# Patient Record
Sex: MALE | Race: ASIAN
[De-identification: names, ages, dates, MRNs, and addresses within clinical notes are randomized per-mention and may not be internally consistent; named-entity substitution may affect disease eponyms.]

---

## 2017-09-25 ENCOUNTER — HOSPITAL ENCOUNTER (OUTPATIENT)
Dept: HOSPITAL 74 - JER | Age: 22
Setting detail: OBSERVATION
LOS: 3 days | Discharge: HOME | End: 2017-09-28
Attending: INTERNAL MEDICINE | Admitting: INTERNAL MEDICINE
Payer: COMMERCIAL

## 2017-09-25 VITALS — BODY MASS INDEX: 25.1 KG/M2

## 2017-09-25 DIAGNOSIS — R55: ICD-10-CM

## 2017-09-25 DIAGNOSIS — R00.0: Primary | ICD-10-CM

## 2017-09-25 DIAGNOSIS — H93.19: ICD-10-CM

## 2017-09-25 DIAGNOSIS — I10: ICD-10-CM

## 2017-09-25 DIAGNOSIS — R50.9: ICD-10-CM

## 2017-09-25 DIAGNOSIS — H83.09: ICD-10-CM

## 2017-09-25 DIAGNOSIS — F17.210: ICD-10-CM

## 2017-09-25 PROCEDURE — G0378 HOSPITAL OBSERVATION PER HR: HCPCS

## 2017-09-26 LAB
ALBUMIN SERPL-MCNC: 4.3 G/DL (ref 3.4–5)
ALP SERPL-CCNC: 92 U/L (ref 45–117)
ALT SERPL-CCNC: 29 U/L (ref 12–78)
ANION GAP SERPL CALC-SCNC: 10 MMOL/L (ref 8–16)
ANION GAP SERPL CALC-SCNC: 7 MMOL/L (ref 8–16)
APPEARANCE UR: CLEAR
AST SERPL-CCNC: 15 U/L (ref 15–37)
BILIRUB SERPL-MCNC: 0.2 MG/DL (ref 0.2–1)
BILIRUB UR STRIP.AUTO-MCNC: NEGATIVE MG/DL
CALCIUM SERPL-MCNC: 8.3 MG/DL (ref 8.5–10.1)
CALCIUM SERPL-MCNC: 9 MG/DL (ref 8.5–10.1)
CK SERPL-CCNC: 258 IU/L (ref 39–308)
CO2 SERPL-SCNC: 23 MMOL/L (ref 21–32)
CO2 SERPL-SCNC: 25 MMOL/L (ref 21–32)
COLOR UR: (no result)
CREAT SERPL-MCNC: 0.7 MG/DL (ref 0.7–1.3)
CREAT SERPL-MCNC: 0.9 MG/DL (ref 0.7–1.3)
DEPRECATED RDW RBC AUTO: 13.4 % (ref 11.9–15.9)
GLUCOSE SERPL-MCNC: 122 MG/DL (ref 74–106)
GLUCOSE SERPL-MCNC: 98 MG/DL (ref 74–106)
INR BLD: 0.91 (ref 0.82–1.09)
KETONES UR QL STRIP: NEGATIVE
LEUKOCYTE ESTERASE UR QL STRIP.AUTO: NEGATIVE
MCH RBC QN AUTO: 29.3 PG (ref 25.7–33.7)
MCHC RBC AUTO-ENTMCNC: 34.2 G/DL (ref 32–35.9)
MCV RBC: 85.4 FL (ref 80–96)
NITRITE UR QL STRIP: NEGATIVE
PH BLDV: 7.39 [PH] (ref 7.32–7.42)
PH UR: 5 [PH] (ref 5–8)
PLATELET # BLD AUTO: 286 K/MM3 (ref 134–434)
PLATELET # BLD EST: ADEQUATE 10*3/UL
PMV BLD: 9.1 FL (ref 7.5–11.1)
PROT SERPL-MCNC: 7.4 G/DL (ref 6.4–8.2)
PROT UR QL STRIP: NEGATIVE
PROT UR QL STRIP: NEGATIVE
PT PNL PPP: 10 SEC (ref 9.98–11.88)
RBC # UR STRIP: NEGATIVE /UL
SALICYLATES SERPL-MCNC: < 4 MG/DL (ref 0–30)
SAO2 % BLDV: 23.1 MEQ/L (ref 19–25)
SP GR UR: 1.02 (ref 1–1.02)
TOTAL CELLS COUNTED BLD: 100
TROPONIN I SERPL-MCNC: < 0.02 NG/ML (ref 0–0.05)
URINE MARIJUANA THC: POSITIVE NG/ML
UROBILINOGEN UR STRIP-MCNC: NEGATIVE MG/DL (ref 0.2–1)
VARIANT LYMPHS NFR BLD MANUAL: 5 % (ref 0–80)
WBC # BLD AUTO: 10.6 K/MM3 (ref 4–10)

## 2017-09-26 RX ADMIN — CEFTRIAXONE SCH MLS/HR: 1 INJECTION, POWDER, FOR SOLUTION INTRAMUSCULAR; INTRAVENOUS at 20:19

## 2017-09-26 NOTE — CONS
DATE OF CONSULTATION:  09/26/2017

 

REQUESTED BY:  Cristal Sahu MD

 

REASON FOR CONSULTATION:  Sinus tachycardia.

 

HISTORY OF PRESENT ILLNESS:  The patient is a 22-year-old male with no chronic

medical problems who presented to the emergency room last evening with complaints of

generalized weakness, lightheadedness and nausea that began around 11 p.m.  He states

he smoked marijuana for the first time and a short while afterwards began feeling

these stated symptoms.  He was lying down at home and then began to feel lightheaded,

nauseous, pale, and shaky.  According to the ER records, he called his friend who

lives downstairs to help him to the emergency room.  The friend reported he was

shaking and pale.  They called a taxi and then obtained a ride from a passerby to the

emergency room.  On arrival in the ER, he was lightheaded and presyncopal.  He

appeared pale, diaphoretic, and lethargic.  Of note, he was tachycardic with a

low-grade temperature, 99.3 rectally, and an initial blood pressure of 140/110.  His

blood glucose was normal.  He denies sick contacts, recent travel.  He is from [facility],

immigrated here years ago, currently works in a grocery store.  He denied headaches,

changes in vision, weakness, numbness.  He denies vomiting, diarrhea.  No chest pain

or shortness of breath.  He has no allergies.

Takes no chronic medications.

 

FAMILY HISTORY:  Negative for early CAD or sudden cardiac death.

 

SOCIAL HISTORY:  Smokes 1 pack per day of cigarettes.  He denies recreational drug

use apart from his use of marijuana last night.  He does not drink.

 

PHYSICAL EXAMINATION:

General:  He was sleeping but easily aroused.

Vital Signs:  Temperature 98.8, pulse 95, regular, blood pressure 129/95, O2

saturation 99 on room air, breathing at 12 respirations per minute.  Telemetry: 

Sinus rhythm, sinus tachycardia.

Neck:  No bruits.

Heart:  S1, 2 regular.  No murmurs.

Chest:  Clear.  No wheezing, rales or rhonchi.

Abdomen:  Soft, nontender.

Extremities:  Warm.  No clubbing, cyanosis or edema.

 

LABORATORIES:  White count initially 10.6, hematocrit 47, platelets 286.  There is a

lymphocyte shift with 60% lymphocytes.  D-dimer was negative.  INR 0.9.  VBG 7.39,

39, 68.  Sodium 139, potassium was 3.8, creatinine 0.9, lactic acid 2.7.  LFTs were

normal.  CK and troponin were negative.

 

Urinalysis was negative.  Toxicology was negative for salicylates, acetaminophen, and

only positive for marijuana.

 

Cultures are pending, including urine and blood; influenza swab was negative.

 

Chest x-ray was negative.  Head CT was negative.

 

ECG showed sinus tachycardia at 122 beats per minute.

 

IMPRESSION:  Sinus tachycardia, hypertension, malaise and low-grade fever of unclear

etiology.  The differential diagnosis is broad at this point and includes infection,

drug effect, or metabolic abnormality.

 

RECOMMENDATIONS:  

1.  Follow cultures.

2.  Echocardiogram for assessment of EF, rule out pericardial effusion.

3.  Telemetry.

4.  Further workup as per PMD.  Will follow.

 

Thank you for the consultation.

 

 

GERARDO SAGASTUME M.D.

 

[id number]

DD: 09/26/2017 08:36

DT: 09/26/2017 14:39

Job #:  [id number]

## 2017-09-26 NOTE — PN
Progress Note (short form)





- Note


Progress Note: 


Consult Dictated





Sinus tachycardia, HTN, malaise, low grade fever


Broad differential: infection/drug effect/metabolic


Unclear etiology at this point, but seems to be improving





REC:


Follow cultures


Echo


Telemetry


Further work up as per PMD

## 2017-09-26 NOTE — EKG
Test Reason : 

Blood Pressure : ***/*** mmHG

Vent. Rate : 122 BPM     Atrial Rate : 122 BPM

   P-R Int : 156 ms          QRS Dur : 092 ms

    QT Int : 320 ms       P-R-T Axes : 044 080 037 degrees

   QTc Int : 456 ms

 

SINUS TACHYCARDIA

OTHERWISE NORMAL ECG

WHEN COMPARED WITH ECG OF 25-SEP-2017 23:52,

PREVIOUS ECG HAS UNDETERMINED RHYTHM, NEEDS REVIEW

BASELINE ARTIFACTS

REPEAT EKG IF CLINICALLY INDICATED

Confirmed by NAOMI MENDEZ MD ([id number]) on 9/26/2017 10:16:49 AM

 

Referred By:             Confirmed By:NAOMI MENDEZ MD

## 2017-09-26 NOTE — CON.ID
Consult


Consult Specialty:: infectious diseases


Reason for Consultation:: fever weakness,lactic acidosis





- History of Present Illness


Chief Complaint: weakness


History of Present Illness: 


22 year old male, with no significant past medical, admitted with weakness, 

lightheadedness, and nausea 


. The patient says that he was laying on the bed at home when he suddenly felt 

lightheaded, nauseas, pale, and shaky. He called his friend, who lives 

downstairs, to help him get to the emergency room. His friend notes he was 

"yellow" in color and shaking.


In the ED the patient felt very lightheaded like he was going to pass out. He 

was not ambulatory secondary to dizziness upon arrival and was brought in 

emergently to room 10 due to his lighthheadedness. He was pale in appearance, 

diaphoretic and lethargic but arousble. Vitals were notable to for tachycardic 

to 120s, rectal temp 99.3, and /110. 


one thing which was odd is that at this young age patient had such a high blood 

pressure





- History Source


History Provided By: Patient, Medical Record


Limitations to Obtaining History: Language Barrier





- Alcohol/Substance Use


Hx Alcohol Use: No





- Smoking History


Smoking history: Current every day smoker


Have you smoked in the past 12 months: Yes


Aproximately how many cigarettes per day: 5





Home Medications





- Allergies


Allergies/Adverse Reactions: 


 Allergies











Allergy/AdvReac Type Severity Reaction Status Date / Time


 


No Known Allergies Allergy   Verified 09/26/17 00:04














- Home Medications


Home Medications: 


Ambulatory Orders





NK [No Known Home Medication]  09/26/17 











Review of Systems





- Review of Systems


Constitutional: reports: Weakness


Eyes: reports: No Symptoms


HENT: reports: No Symptoms


Neck: reports: No Symptoms


Cardiovascular: reports: No Symptoms


Respiratory: reports: No Symptoms


Gastrointestinal: reports: No Symptoms


Genitourinary: reports: No Symptoms


Musculoskeletal: reports: No Symptoms


Integumentary: reports: No Symptoms


Neurological: reports: Dizziness, Weakness, Other


Endocrine: reports: No Symptoms


Hematology/Lymphatic: reports: No Symptoms


Psychiatric: reports: No Symptoms





Physical Exam


Vital Signs: 


 Vital Signs











Temperature  98.2 F   09/26/17 14:14


 


Pulse Rate  76   09/26/17 14:14


 


Respiratory Rate  18   09/26/17 14:14


 


Blood Pressure  118/67   09/26/17 14:14


 


O2 Sat by Pulse Oximetry (%)  100   09/26/17 09:00











Constitutional: Yes: No Distress, Calm, Thin


Eyes: Yes: Conjunctiva Clear


Neck: Yes: Supple


Cardiovascular: Yes: Regular Rate and Rhythm


Respiratory: Yes: Regular, CTA Bilaterally


Gastrointestinal: Yes: Normal Bowel Sounds, Soft


Musculoskeletal: Yes: WNL


Extremities: Yes: WNL


Integumentary: Yes: WNL


Neurological: Yes: WNL


Psychiatric: Yes: WNL


Labs: 


 CBC, BMP





 09/26/17 06:43 











Imaging





- Results


Chest X-ray: Report Reviewed, Image Reviewed


Cat Scan: Report Reviewed, Image Reviewed





Assessment/Plan


Problem List





- Problems


(1) Tachycardia


Code(s): R00.0 - TACHYCARDIA, UNSPECIFIED





(2) Near syncope


Code(s): R55 - SYNCOPE AND COLLAPSE





plan


will start cefriaxone


await for all cx to be back

## 2017-09-26 NOTE — PDOC
History of Present Illness





- General


History Source: Patient


Exam Limitations: No Limitations





- History of Present Illness


Initial Comments: 





09/26/17 03:22


The patient is a 22 year old male, with no significant past medical, who 

presents to the emergency room complaining of weakness, lightheadedness, and 

nausea that started at 11:00pm this evening, just an hour prior to arrival. The 

patient explains that he was laying on the bed at home when he suddenly felt 

lightheaded, nauseas, pale, and shaky. He called his friend, who lives 

downstairs, to help him get to the emergency room. His friend notes he was 

"yellow" in color and shaking. They called a taxi that never showed up and 

instead hitched a ride from a passerby to the emergency room. Upon arrival, the 

patient felt very lightheaded like he was going to pass out. He was not 

ambulatory secondary to dizziness upon arrival and was brought in emergently to 

room 10 due to his lighthheadedness. He was pale in appearance, diaphoretic and 

lethargic but arousble. Vitals were notable to for tachycardic to 120s, rectal 

temp 99.3, and /110. He notes that he visited his PCP 2 months ago and 

everything was normal. His blood glucose upon arrival was 100. No recent 

travel. No hx of similar sxs. 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 

restaurant. 





Denies headache, changes in vision, focal weakness or numbness.


Denies vomiting, diarrhea. 


Denies chest pain, SOB. 


Denies abdominal pain. 


Denies recent travel. 





Allergies: NKDA


Social Hx: Tobacco use. Denies recreational drug use. No alcohol use. 


PCP: Dr. Anthony Mena





<Bebe Cagle - Last Filed: 09/26/17 03:22>





<Allyn Elizabeth - Last Filed: 09/26/17 03:49>





- General


Chief Complaint: Altered Mental Status


Stated Complaint: WEAKNESS


Time Seen by Provider: 09/26/17 00:09





Past History





<Bebe Cagle - Last Filed: 09/26/17 03:22>





- Suicide/Smoking/Psychosocial Hx


Smoking History: Current every day smoker


Have you smoked in the past 12 months: Yes


Number of Cigarettes Smoked Daily: 5


Information on smoking cessation initiated: No


Hx Alcohol Use: No


Drug/Substance Use Hx: No





<Allyn Elizabeth - Last Filed: 09/26/17 03:49>





- Past Medical History


Allergies/Adverse Reactions: 


 Allergies











Allergy/AdvReac Type Severity Reaction Status Date / Time


 


No Known Allergies Allergy   Verified 09/26/17 00:04











Home Medications: 


Ambulatory Orders





NK [No Known Home Medication]  09/26/17 











**Review of Systems





- Review of Systems


Able to Perform ROS?: Yes


Comments:: 





09/26/17 03:22


GENERAL/CONSTITUTIONAL: +weakness, +dizziness


HEAD, EYES, EARS, NOSE AND THROAT: No change in vision. No ear pain or 

discharge. No sore throat. GASTROINTESTINAL: +nausea. No vomiting, diarrhea or 

constipation. 


GENITOURINARY: No dysuria, frequency, or change in urination. 


CARDIOVASCULAR: No chest pain or shortness of breath. 


RESPIRATORY: No cough, wheezing, or hemoptysis. 


MUSCULOSKELETAL: No joint or muscle swelling or pain. No neck or back pain.


SKIN: No rash 


NEUROLOGIC: No headache, vertigo, loss of consciousness, or change in strength/

sensation. 


ENDOCRINE: No increased thirst. No abnormal weight change. 


HEMATOLOGIC/LYMPHATIC: No anemia, easy bleeding, or history of blood clots. 

ALLERGIC/IMMUNOLOGIC: No hives or skin allergy.





<Bebe Cagle - Last Filed: 09/26/17 03:22>





*Physical Exam





- Vital Signs


 Last Vital Signs











Temp Pulse Resp BP Pulse Ox


 


 99.4 F   121 H  18   145/68   99 


 


 09/25/17 23:58  09/25/17 23:58  09/25/17 23:58  09/25/17 23:58  09/25/17 23:58














- Physical Exam


Comments: 





09/26/17 03:22


GENERAL: Pale in appearance, diaphoretic, lethargic but arousable. A&O x3. 


HEAD: No signs of trauma 


EYES: PERRLA, EOMI, sclera anicteric, conjunctiva clear 


ENT: Auricles normal inspection, hearing grossly normal, nares patent, 

oropharynx clear without exudates. Moist mucosa 


NECK: Normal ROM, supple, no lymphadenopathy, JVD, or masses


LUNGS: Breath sounds equal, clear to auscultation bilaterally. No wheezes, and 

no crackles 


HEART: Tachycardic to 122, but normal rhythm, normal S1 and S2, no murmurs, 

rubs or gallops 


ABDOMEN: Soft, nontender, normoactive bowel sounds. No guarding, no rebound. No 

masses 


EXTREMITIES: Normal range of motion, no edema. No clubbing or cyanosis. No cords

, erythema, or tenderness 


BACK: No midline spinal tenderness in cervical/thoracic/lumbar region 


NEUROLOGICAL: Normal speech, cranial nerves intact, negative pronator drift, 5/

5 strength in all 4 extremities, normal sensation to light touch in all 4 

extremities, normal cerebellar exam, normal gait, normal reflexes and tone 


SKIN: Warm, Dry, pale, no rashes or lesions noted.





<Bebe Cagle - Last Filed: 09/26/17 03:22>





- Vital Signs


 Last Vital Signs











Temp Pulse Resp BP Pulse Ox


 


 99.4 F   121 H  18   145/68   99 


 


 09/25/17 23:58  09/25/17 23:58  09/25/17 23:58  09/25/17 23:58  09/25/17 23:58














<Allyn Elizabeth - Last Filed: 09/26/17 03:49>





**Heart Score/ECG Review


  ** #1





09/26/17 03:46


Sinus tachycardia, rate 122, normal axis and intervals. N MARTA. Large voltage 

QRS. No dagger Q waves. 





<Allyn Elizabeth - Last Filed: 09/26/17 03:49>





ED Treatment Course





- LABORATORY


CBC & Chemistry Diagram: 


 09/26/17 00:08





 09/26/17 00:08





- ADDITIONAL ORDERS


Additional order review: 


 Laboratory  Results











  09/26/17 09/26/17 09/26/17





  00:40 00:40 00:27


 


PT with INR   


 


INR   


 


D-Dimer   


 


Sodium   


 


Potassium   


 


Chloride   


 


Carbon Dioxide   


 


Anion Gap   


 


BUN   


 


Creatinine   


 


Creat Clearance w eGFR   


 


POC Glucometer   


 


Random Glucose   


 


Lactic Acid    2.7 H*


 


Calcium   


 


Total Bilirubin   


 


AST   


 


ALT   


 


Alkaline Phosphatase   


 


Creatine Kinase   


 


Creatine Kinase Index   


 


CK-MB (CK-2)   


 


Troponin I   


 


Total Protein   


 


Albumin   


 


Urine Color   Ltyellow 


 


Urine Appearance   Clear 


 


Urine pH   5.0 


 


Urine Protein   Negative 


 


Urine Glucose (UA)   Negative 


 


Urine Ketones   Negative 


 


Urine Blood   Negative 


 


Urine Nitrite   Negative 


 


Urine Bilirubin   Negative 


 


Urine Urobilinogen   Negative 


 


Salicylates   


 


Opiates Screen  Negative  


 


Methadone Screen  Negative  


 


Acetaminophen   


 


Barbiturate Screen  Negative  


 


Phencyclidine Screen  Negative  


 


Ur Amphetamines Screen  Negative  


 


MDMA (Ecstasy) Screen  Negative  


 


Benzodiazepines Screen  Negative  


 


Cocaine Screen  Negative  


 


U Marijuana (THC) Screen  Positive  


 


Alcohol, Quantitative   


 


Blood Type   


 


Antibody Screen   














  09/26/17 09/26/17 09/26/17





  00:08 00:08 00:08


 


PT with INR   


 


INR   


 


D-Dimer    < 200


 


Sodium   


 


Potassium   


 


Chloride   


 


Carbon Dioxide   


 


Anion Gap   


 


BUN   


 


Creatinine   


 


Creat Clearance w eGFR   


 


POC Glucometer   


 


Random Glucose   


 


Lactic Acid   


 


Calcium   


 


Total Bilirubin   


 


AST   


 


ALT   


 


Alkaline Phosphatase   


 


Creatine Kinase   


 


Creatine Kinase Index   


 


CK-MB (CK-2)   


 


Troponin I   


 


Total Protein   


 


Albumin   


 


Urine Color   


 


Urine Appearance   


 


Urine pH   


 


Urine Protein   


 


Urine Glucose (UA)   


 


Urine Ketones   


 


Urine Blood   


 


Urine Nitrite   


 


Urine Bilirubin   


 


Urine Urobilinogen   


 


Salicylates   < 4.0 


 


Opiates Screen   


 


Methadone Screen   


 


Acetaminophen   < 11 


 


Barbiturate Screen   


 


Phencyclidine Screen   


 


Ur Amphetamines Screen   


 


MDMA (Ecstasy) Screen   


 


Benzodiazepines Screen   


 


Cocaine Screen   


 


U Marijuana (THC) Screen   


 


Alcohol, Quantitative  < 5.0  


 


Blood Type   


 


Antibody Screen   














  09/26/17 09/26/17 09/26/17





  00:08 00:08 00:08


 


PT with INR    10.00


 


INR    0.91


 


D-Dimer   


 


Sodium   139 


 


Potassium   3.2 L 


 


Chloride   104 


 


Carbon Dioxide   25 


 


Anion Gap   10 


 


BUN   11 


 


Creatinine   0.9 


 


Creat Clearance w eGFR   > 60 


 


POC Glucometer   


 


Random Glucose   122 H 


 


Lactic Acid   


 


Calcium   9.0 


 


Total Bilirubin   0.2 


 


AST   15 


 


ALT   29 


 


Alkaline Phosphatase   92 


 


Creatine Kinase   258 


 


Creatine Kinase Index   0.3 


 


CK-MB (CK-2)   < 1.000 


 


Troponin I   < 0.02 


 


Total Protein   7.4 


 


Albumin   4.3 


 


Urine Color   


 


Urine Appearance   


 


Urine pH   


 


Urine Protein   


 


Urine Glucose (UA)   


 


Urine Ketones   


 


Urine Blood   


 


Urine Nitrite   


 


Urine Bilirubin   


 


Urine Urobilinogen   


 


Salicylates   


 


Opiates Screen   


 


Methadone Screen   


 


Acetaminophen   


 


Barbiturate Screen   


 


Phencyclidine Screen   


 


Ur Amphetamines Screen   


 


MDMA (Ecstasy) Screen   


 


Benzodiazepines Screen   


 


Cocaine Screen   


 


U Marijuana (THC) Screen   


 


Alcohol, Quantitative   


 


Blood Type  A POSITIVE  


 


Antibody Screen  Negative  














  09/25/17





  23:49


 


PT with INR 


 


INR 


 


D-Dimer 


 


Sodium 


 


Potassium 


 


Chloride 


 


Carbon Dioxide 


 


Anion Gap 


 


BUN 


 


Creatinine 


 


Creat Clearance w eGFR 


 


POC Glucometer  100.68159


 


Random Glucose 


 


Lactic Acid 


 


Calcium 


 


Total Bilirubin 


 


AST 


 


ALT 


 


Alkaline Phosphatase 


 


Creatine Kinase 


 


Creatine Kinase Index 


 


CK-MB (CK-2) 


 


Troponin I 


 


Total Protein 


 


Albumin 


 


Urine Color 


 


Urine Appearance 


 


Urine pH 


 


Urine Protein 


 


Urine Glucose (UA) 


 


Urine Ketones 


 


Urine Blood 


 


Urine Nitrite 


 


Urine Bilirubin 


 


Urine Urobilinogen 


 


Salicylates 


 


Opiates Screen 


 


Methadone Screen 


 


Acetaminophen 


 


Barbiturate Screen 


 


Phencyclidine Screen 


 


Ur Amphetamines Screen 


 


MDMA (Ecstasy) Screen 


 


Benzodiazepines Screen 


 


Cocaine Screen 


 


U Marijuana (THC) Screen 


 


Alcohol, Quantitative 


 


Blood Type 


 


Antibody Screen 




















 09/26/17 01:13 Influenza Types A,B Antigen (ELSA) - Final





 Nasopharyngeal Swab  - Final








 











  09/26/17 09/25/17





  00:08 23:49


 


RBC  5.53 


 


MCV  85.4 


 


MCHC  34.2 


 


RDW  13.4 


 


MPV  9.1 


 


Neutrophils %  No Result Required. 


 


Lymphocytes %  No Result Required. 


 


POC Glucometer   100.02306














- RADIOLOGY


Radiograph Interpretation: 





09/26/17 01:49


EXAM: CT HEAD


HISTORY:Loss of consciousness


COMPARISON: None.


TECHNIQUE: CT Head with serial axial images extending from the vertex to the 

base of skull was


performed without vascular contrast.


FINDINGS:Brain parenchyma is normal in attenuation with no mass or hematoma. 

There is no


midline shift. Grey and white matter differentiation is normal.


Ventricles are normal.


Sulci and extra-axial CSF spaces are normal.


Intracranial vascular structures are normal in attenuation.


There is no calvarial fracture.


Paranasal sinuses are normally aerated.





IMPRESSION: Normal head


THIS DOCUMENT HAS BEEN ELECTRONICALLY SIGNED


Scot aLra MD


09/26/2017 01:34 EST


M.D. Please call Imaging On Call [phone] ([phone]) with questions.








<Bebe Cagle - Last Filed: 09/26/17 03:22>





- LABORATORY


CBC & Chemistry Diagram: 


 09/26/17 00:08





 09/26/17 00:08





- ADDITIONAL ORDERS


Additional order review: 


 Laboratory  Results











  09/25/17





  23:49


 


POC Glucometer  100.67665








 











  09/25/17





  23:49


 


POC Glucometer  100.99446














- RADIOLOGY


Radiology Studies Ordered: 














 Category Date Time Status


 


 CHEST X-RAY PORTABLE* [RAD] Stat Radiology  09/26/17 00:05 Ordered














<Allyn Elizabeth - Last Filed: 09/26/17 03:49>





Medical Decision Making





- Medical Decision Making





09/26/17 03:20


Dr. Sahu was paged via [employer] service. 





<Bebe Cagle - Last Filed: 09/26/17 03:22>





- Medical Decision Making


09/26/17 03:28


22-year-old male denies past medical history presents with lightheadedness, 

nausea and presyncope. Exam notable for pale appearance, diaphoresis, and 

tachycardia. Differential is wide and includes toxic metabolic syndrome versus 

infection versus endocrine abnormality.


-monitor


-labs


-IVF


-UA/CXR


-CTH


-reassess





09/26/17 03:42


Pt remains tachycardic to 120s.  Utox positive for marijuana. WBC 10.6. dimer 

and trop negative. CTH and CXR negative. Possible K2 ingestion? Pt no longer 

pale but continues to be lightheaded and diaphoretic. WIll admit for obs given 

continued tachycardia of unknown etiology. Discussed case with Dr. Sahu who is 

covering for Dr. Anthony Mena. She requests consult to Dr. Chambers. Case 

discussed in detail with admitting physician Dr. Sahu including history, 

physical exam and ancillary studies.





Admitting physician has assumed care for the patient, will follow all pending 

diagnostics and will complete the evaluation and treatment.  





<Allyn Elizabeth - Last Filed: 09/26/17 03:49>





*DC/Admit/Observation/Transfer





- Attestations


Scribe Attestion: 





09/26/17 01:49





Documentation prepared by MANOLO Wilkinson, acting as medical scribe 

for Allyn Elizabeth MD.





<Bebe Cagle - Last Filed: 09/26/17 03:22>





- Discharge Dispostion


Admit: Yes





- Attestations


Physician Attestion: 





09/26/17 03:49








I, Dr. Allyn Elizabeth MD, attest that this document has been prepared under my 

direction and personally reviewed by me in its entirety.   I further attest, 

that it accurately reflects all work, treatment, procedures and medical decision

-making performed by me.  





<Allyn Elizabeth - Last Filed: 09/26/17 03:49>


Diagnosis at time of Disposition: 


 Tachycardia





- Discharge Dispostion


Condition at time of disposition: Improved

## 2017-09-27 LAB
ALBUMIN SERPL-MCNC: 3.5 G/DL (ref 3.4–5)
ALP SERPL-CCNC: 79 U/L (ref 45–117)
ALT SERPL-CCNC: 25 U/L (ref 12–78)
ANION GAP SERPL CALC-SCNC: 10 MMOL/L (ref 8–16)
AST SERPL-CCNC: 12 U/L (ref 15–37)
BASOPHILS # BLD: 0.5 % (ref 0–2)
BILIRUB SERPL-MCNC: 0.5 MG/DL (ref 0.2–1)
CALCIUM SERPL-MCNC: 8.8 MG/DL (ref 8.5–10.1)
CO2 SERPL-SCNC: 25 MMOL/L (ref 21–32)
CREAT SERPL-MCNC: 0.8 MG/DL (ref 0.7–1.3)
DEPRECATED RDW RBC AUTO: 13.7 % (ref 11.9–15.9)
EOSINOPHIL # BLD: 2.8 % (ref 0–4.5)
GLUCOSE SERPL-MCNC: 102 MG/DL (ref 74–106)
MCH RBC QN AUTO: 28.8 PG (ref 25.7–33.7)
MCHC RBC AUTO-ENTMCNC: 33.1 G/DL (ref 32–35.9)
MCV RBC: 86.9 FL (ref 80–96)
NEUTROPHILS # BLD: 31.3 % (ref 42.8–82.8)
PLATELET # BLD AUTO: 207 K/MM3 (ref 134–434)
PMV BLD: 8.9 FL (ref 7.5–11.1)
PROT SERPL-MCNC: 6.4 G/DL (ref 6.4–8.2)
WBC # BLD AUTO: 5.8 K/MM3 (ref 4–10)

## 2017-09-27 RX ADMIN — CEFTRIAXONE SCH MLS/HR: 1 INJECTION, POWDER, FOR SOLUTION INTRAMUSCULAR; INTRAVENOUS at 10:15

## 2017-09-27 NOTE — CONSULT
Consult - text type





- Consultation


Consultation Note: 





NEUROLOGY CONSULTATION is greatly appreciated:





This 21 yo RH man with no significant PMH has had episodic, high-pitched 

tinnitus in his left ear for a few months. No hearing loss.


Last night, when laying back in bed, he developed the sudden onset of spinning 

vertigo (room moving to the right) associated with nausea and unsteady gait. 

Waxed and waned but increased by movement. These symptoms persisted until he 

arrived in the ER where BP was 140/80. 


CT of head (reviewed): Normal.


Now Pt feels back to normal.





NEURO: Entirely normal without Nystagmus; No FTN or HTS dystaxia.


           Normal gait including tandem.





IMP: Acute labyrinthitis, probably left-sided with tinnitus





SUGGEST: Check orthostatic BP's. Agree with telemetry.


               OK to discharge with Neuro F/U and ENT consultation if symptoms 

recur.


               If vertigo recurs, MRI of brain (C-/C+) with att: CP angles, and 

Meclizine Rx.





Thank you very much,


Brian Ewdards MD

## 2017-09-27 NOTE — PN
Progress Note, Physician


Chief Complaint: 


feeling better


TELE: NSR


Echo: pending result











- Current Medication List


Current Medications: 


Active Medications





Dextrose/Sodium Chloride (D5-1/2ns -)  1,000 mls @ 75 mls/hr IV ASDIR [facility]


   Last Admin: 09/26/17 17:13 Dose:  75 mls/hr


Ceftriaxone Sodium 1 gm/ (Dextrose)  50 mls @ 100 mls/hr IVPB DAILY [facility]


   Last Admin: 09/26/17 20:19 Dose:  100 mls/hr











- Objective


Vital Signs: 


 Vital Signs











Temperature  98 F   09/27/17 06:56


 


Pulse Rate  72   09/27/17 06:56


 


Respiratory Rate  18   09/27/17 06:56


 


Blood Pressure  126/67   09/27/17 06:56


 


O2 Sat by Pulse Oximetry (%)  100   09/26/17 20:19











Constitutional: Yes: No Distress


Cardiovascular: Yes: Regular Rate and Rhythm (no murmurs)


Respiratory: Yes: CTA Bilaterally


Edema: No


Neurological: Yes: Alert, Oriented


...Motor Strength: WNL


Labs: 


 CBC, BMP





 09/27/17 05:30 





 09/27/17 05:30 





 INR, PTT











INR  0.91  (0.82-1.09)   09/26/17  00:08    














- ....Imaging


EKG: Image Reviewed





Assessment/Plan


Sinus tachycardia, HTN, malaise, low grade fever


Broad differential: infection/drug effect/metabolic


Unclear etiology at this point, but seems to be improving





REC:


1. Await ID reccs


2. F/u cultures


3. F/u Echo


4. Check TSH

## 2017-09-27 NOTE — PN
Progress Note, Physician





- Current Medication List


Current Medications: 


Active Medications





Dextrose/Sodium Chloride (D5-1/2ns -)  1,000 mls @ 75 mls/hr IV ASDIR NACHO


   Last Admin: 09/26/17 17:13 Dose:  75 mls/hr











- Objective


Vital Signs: 


 Vital Signs











Temperature  98 F   09/27/17 22:00


 


Pulse Rate  67   09/27/17 22:00


 


Respiratory Rate  20   09/27/17 22:00


 


Blood Pressure  112/53   09/27/17 22:00


 


O2 Sat by Pulse Oximetry (%)  98   09/27/17 21:00











Constitutional: Yes: No Distress


Neck: Yes: WNL, Supple


Cardiovascular: Yes: WNL, Regular Rate and Rhythm


Respiratory: Yes: WNL, Regular, CTA Bilaterally


Gastrointestinal: Yes: WNL, Normal Bowel Sounds, Soft


Labs: 


 CBC, BMP





 09/27/17 05:30 





 09/27/17 05:30 





 INR, PTT











INR  0.91  (0.82-1.09)   09/26/17  00:08    














Problem List





- Problems


(1) Tachycardia


Assessment/Plan: 


?Dehydration vs sepsis


Cont IVF


Awaiting echo


DC planning for am


Code(s): R00.0 - TACHYCARDIA, UNSPECIFIED





(2) Near syncope


Assessment/Plan: 


?vertigo


No clear etiology


Lactic returned to normal


Neuro/ID consults noted


Code(s): R55 - SYNCOPE AND COLLAPSE

## 2017-09-27 NOTE — PN
Progress Note, Physician


History of Present Illness: 


patient feeling much better


says he feels normal





- Current Medication List


Current Medications: 


Active Medications





Dextrose/Sodium Chloride (D5-1/2ns -)  1,000 mls @ 75 mls/hr IV ASDIR NACHO


   Last Admin: 09/26/17 17:13 Dose:  75 mls/hr











- Objective


Vital Signs: 


 Vital Signs











Temperature  98.2 F   09/27/17 14:13


 


Pulse Rate  64   09/27/17 14:13


 


Respiratory Rate  18   09/27/17 14:13


 


Blood Pressure  118/62   09/27/17 14:13


 


O2 Sat by Pulse Oximetry (%)  98   09/27/17 09:00











Constitutional: Yes: No Distress, Calm


Cardiovascular: Yes: Regular Rate and Rhythm


Respiratory: Yes: Regular, CTA Bilaterally


Gastrointestinal: Yes: Normal Bowel Sounds, Soft


Musculoskeletal: Yes: WNL


Extremities: Yes: WNL


Neurological: Yes: Alert, Oriented


Psychiatric: Yes: Alert, Oriented


Labs: 


 CBC, BMP





 09/27/17 05:30 





 09/27/17 05:30 





 INR, PTT











INR  0.91  (0.82-1.09)   09/26/17  00:08    














Assessment/Plan


Problem List





- Problems


(1) Tachycardia


Code(s): R00.0 - TACHYCARDIA, UNSPECIFIED





(2) Near syncope


Code(s): R55 - SYNCOPE AND COLLAPSE





all cx reports noted





plan


will stop abx


cardio on case


rest as per primary


will see how patient does

## 2017-09-28 VITALS — HEART RATE: 68 BPM | DIASTOLIC BLOOD PRESSURE: 68 MMHG | TEMPERATURE: 98 F | SYSTOLIC BLOOD PRESSURE: 126 MMHG

## 2017-09-28 NOTE — PN
Progress Note, Physician


Chief Complaint: 


feels well








- Current Medication List


Current Medications: 


Active Medications





Dextrose/Sodium Chloride (D5-1/2ns -)  1,000 mls @ 75 mls/hr IV ASDIR NACHO


   Last Admin: 09/26/17 17:13 Dose:  75 mls/hr











- Objective


Vital Signs: 


 Vital Signs











Temperature  98.2 F   09/28/17 05:31


 


Pulse Rate  60   09/28/17 05:31


 


Respiratory Rate  18   09/28/17 05:31


 


Blood Pressure  108/65   09/28/17 05:31


 


O2 Sat by Pulse Oximetry (%)  98   09/27/17 21:00











Constitutional: Yes: No Distress


Cardiovascular: Yes: Regular Rate and Rhythm


Respiratory: Yes: CTA Bilaterally


Gastrointestinal: Yes: Soft


Edema: No


Neurological: Yes: Alert, Oriented


Labs: 


 CBC, BMP





 09/27/17 05:30 





 09/27/17 05:30 





 INR, PTT











INR  0.91  (0.82-1.09)   09/26/17  00:08    














Assessment/Plan


Assessment/Plan


Sinus tachycardia, HTN, malaise, low grade fever


Broad differential: infection/drug effect/metabolic


Unclear etiology at this point, but seems to be improving





REC:


1. Await ID reccs


2. F/u cultures


3.Echo ok





Can d/c home from [facility] with outpatient f/u

## 2017-09-28 NOTE — DS
Physical Examination


Vital Signs: 


 Vital Signs











Temperature  98.2 F   09/28/17 05:31


 


Pulse Rate  60   09/28/17 05:31


 


Respiratory Rate  18   09/28/17 05:31


 


Blood Pressure  108/65   09/28/17 05:31


 


O2 Sat by Pulse Oximetry (%)  98   09/27/17 21:00











Labs: 


 CBC, BMP





 09/27/17 05:30 





 09/27/17 05:30 











Discharge Summary


Reason For Visit: TACHYCARDIA


Current Active Problems





Near syncope (Acute) 


Tachycardia (Acute) 








Condition: Good





- Instructions


Diet, Activity, Other Instructions: 


Regular diet





See Dr Mena in 1 week  [phone]





Disposition: HOME





- Home Medications


Comprehensive Discharge Medication List: 


Ambulatory Orders





NK [No Known Home Medication]  09/26/17

## 2018-10-16 ENCOUNTER — HOSPITAL ENCOUNTER (EMERGENCY)
Dept: HOSPITAL 74 - JER | Age: 23
LOS: 1 days | Discharge: HOME | End: 2018-10-17
Payer: COMMERCIAL

## 2018-10-16 VITALS — BODY MASS INDEX: 25.8 KG/M2

## 2018-10-16 VITALS — HEART RATE: 91 BPM | TEMPERATURE: 99 F | DIASTOLIC BLOOD PRESSURE: 78 MMHG | SYSTOLIC BLOOD PRESSURE: 133 MMHG

## 2018-10-16 DIAGNOSIS — L03.012: Primary | ICD-10-CM

## 2018-10-17 NOTE — PDOC
Attending Attestation





- Resident


Resident Name: Kamran Camacho





- HPI


HPI: 





10/17/18 02:52


The patient is a 23 year old male with no significant pmh who presents to the 

ED with left finger pain. the patient states that he experienced an onset of 

pain, swelling and redness to his right 2nd digit and nail for 1 week. the 

patient states that he had attempted to remove the skin from his finer by 

pulling in it. he states that he attempted antimicrobial topical for 1 day with 

no improvement. Reports clear drainage from site this morning. He denies any 

foreign body or trauma to area of hand.He denies any other symptoms of 

complaints. it is noted that the patient is right hand dominant. 








Documentation prepared by Peret Piña, acting as medical scribe for Smiley Arias MD.





<Preet Piña - Last Filed: 10/17/18 02:52>





- ED Attending Attestation


I have performed the following: I have examined & evaluated the patient, The 

case was reviewed & discussed with the resident, I agree w/resident's findings 

& plan, Exceptions are as noted





- Physicial Exam


PE: 





10/18/18 05:01


On examination:


Pt is well appearing


RRR


CTA


Hand:


paronichychium is denuded, epidermis removed, no purulent drainage


(+) swelling


No streaking erythema





- Medical Decision Making





10/18/18 05:02


24 yo male with deep injury to his finger (after ripping off a hang nail)


mild erythema suggests early cellulitis


Will give po abx


Pt can continue to apply topical abx


Follow up with PMD within 2 days OR the ER for wound check





<Smiley Arias - Last Filed: 10/18/18 05:06>

## 2018-10-17 NOTE — PDOC
History of Present Illness





- General


Chief Complaint: Wound


Stated Complaint: FINGER INJURY


Time Seen by Provider: 10/17/18 00:01





- History of Present Illness


Initial Comments: 





10/17/18 00:08


22 yo M with no significant pmh who p/w with L sided 2nd digit pain, and 

redness. Patient reports acute onset of warmth, and swelling to left sided 2nd 

digit at lateral nail fold. He attempted pulling of the skin of his finger with 

subsequent worsening, warmth, pain, and ulceration of involved area. Attempted 

OTC antimicrobial topical x 1 day with no improvement. Reports clear drainage 

from site this AM. Denies h/o similar symptoms. No foreign body or trauma to 

area of hand. Patient R hand dominant. 





Patient denies N/V, F,C, CP, SOB, urinary complaints, abdominal pain, diarrhea, 

constipation, lightheadedness, weakness, sensory changes. 





PMHx: as noted above


ROS: as noted


SHx: Tobacco use 1 ppd x 5 years. Denies Etoh, IVDA


Allergies: NKDA








Past History





- Past Medical History


Allergies/Adverse Reactions: 


 Allergies











Allergy/AdvReac Type Severity Reaction Status Date / Time


 


No Known Allergies Allergy   Verified 09/26/17 00:04











Home Medications: 


Ambulatory Orders





Sulfamethoxazole/Trimethoprim [Bactrim Ds Tablet] 1 each PO BID 7 Days #14 

tablet MDD 2 tab 10/17/18 








Cancer: No


Cardiac Disorders: No


CVA: No


COPD: No


CHF: No





- Surgical History


Cardiac Surgery: No


Cholecystectomy: No


Gastric Stapling: No


GI Surgery: No





- Suicide/Smoking/Psychosocial Hx


Smoking History: Current every day smoker


Have you smoked in the past 12 months: Yes


Number of Cigarettes Smoked Daily: 20


Information on smoking cessation initiated: Yes


'Breaking Loose' booklet given: 10/16/18


Hx Alcohol Use: No


Drug/Substance Use Hx: No


Substance Use Type: None


Hx Substance Use Treatment: No





**Review of Systems





- Review of Systems


Comments:: 





10/17/18 00:08


GENERAL/CONSTITUTIONAL: No fever or chills. No weakness.


HEAD, EYES, EARS, NOSE AND THROAT: No change in vision. No ear pain or 

discharge. No sore throat.


CARDIOVASCULAR: No chest pain or shortness of breath


RESPIRATORY: No cough, wheezing, or hemoptysis.


GASTROINTESTINAL: No nausea, vomiting, diarrhea or constipation.


GENITOURINARY: No dysuria, frequency, or change in urination.


MUSCULOSKELETAL:2nd digit pain, and redness. No joint or muscle swelling or 

pain. No neck or back pain.


SKIN: No rash


NEUROLOGIC: No headache, vertigo, loss of consciousness, or change in strength/

sensation.


ENDOCRINE: No increased thirst. No abnormal weight change


HEMATOLOGIC/LYMPHATIC: No anemia, easy bleeding, or history of blood clots.


ALLERGIC/IMMUNOLOGIC: No hives or skin allergy.








*Physical Exam





- Vital Signs


 Last Vital Signs











Temp Pulse Resp BP Pulse Ox


 


 99 F   91 H  20   133/78   99 


 


 10/16/18 23:00  10/16/18 23:00  10/16/18 23:00  10/16/18 23:00  10/16/18 23:00














- Physical Exam


Comments: 





10/17/18 00:08





GENERAL: Awake, alert, and fully oriented, in no acute distress


HEAD: No signs of trauma, normocephalic, atraumatic 


EYES: PERRLA, EOMI, sclera anicteric, conjunctiva clear


ENT: Hearing grossly normal, nares patent, oropharynx clear without


exudates. Moist mucosa


NECK: Normal ROM, supple, no lymphadenopathy, JVD, or masses


LUNGS: No distress, speaks full sentences, clear to auscultation bilaterally 


HEART: Regular rate and rhythm, normal S1 and S2, no murmurs, rubs or gallops, 

peripheral pulses normal and equal bilaterally. 


EXTREMITIES : Normal inspection, Normal range of motion, no edema.  No clubbing 

or cyanosis. 


L HAND: + Left sided distal, medial nailfold, 4x5 mm,  area of ulceration with 

surrounding erythematous margins, and clear discharge, with absent fluctuance 

or induration. lesion does not cross joint line. Normal ROM. + Warmth, erythema

, and ttp. Palpable and symmetric radial pulses. No foreign body. Nailbed 

intact. 


SKIN: Warm, Dry, normal turgor. 











Medical Decision Making





- Medical Decision Making





10/17/18 00:23


22 yo M with no significant pmh who p/w with L sided distal, 2nd digit pain, 

and redness. VSS, AF. + Left sided distal, medial nailfold, 4x5 mm,  area of 

ulceration with surrounding erythematous margins, and clear discharge. Ext. 

neurovascularly intact. Non toxic appearing, with absent systemic symptoms. 

Absent foreign body. symptoms likely 2/2 paronychia vs. cellulitis. 0/4 SIRS 

criteria. flexor tendon intact. Absent fluctuance. Low suspicion hand abscess 

or deep space infection. 





Ed Course: 


+ Left sided distal, medial nailfold, 4x5 mm,  area of ulceration with 

surrounding erythematous margins, and clear discharge








10/17/18 00:32


Bactrim sent to pharmacy. 


10/17/18 01:11


Patient stable for d/c with return precautions. Advised to f/u with PMD. 





*DC/Admit/Observation/Transfer


Diagnosis at time of Disposition: 


Paronychia of finger


Qualifiers:


 Laterality: left Qualified Code(s): L03.012 - Cellulitis of left finger








- Discharge Dispostion


Condition at time of disposition: Stable


Decision to Admit order: No





- Prescriptions


Prescriptions: 


Sulfamethoxazole/Trimethoprim [Bactrim Ds Tablet] 1 each PO BID 7 Days #14 

tablet MDD 2 tab





- Referrals


Referrals: 


Anthony Mena MD [Primary Care Provider] - 





- Patient Instructions


Printed Discharge Instructions:  DI for Paronychia


Additional Instructions: 


Please return to the emergency department with any new or worsening symptoms or 

concerns. Please follow up with your primary care physician within 72 hours.











- Post Discharge Activity





- Attestations


Physician Attestion: 





10/17/18 00:08





I attest to the information provided in this note.

## 2018-10-24 ENCOUNTER — HOSPITAL ENCOUNTER (EMERGENCY)
Dept: HOSPITAL 74 - JERFT | Age: 23
Discharge: HOME | End: 2018-10-24
Payer: COMMERCIAL

## 2018-10-24 VITALS — BODY MASS INDEX: 22.6 KG/M2

## 2018-10-24 VITALS — SYSTOLIC BLOOD PRESSURE: 123 MMHG | DIASTOLIC BLOOD PRESSURE: 67 MMHG | TEMPERATURE: 98.9 F | HEART RATE: 85 BPM

## 2018-10-24 DIAGNOSIS — L03.012: ICD-10-CM

## 2018-10-24 DIAGNOSIS — B00.89: ICD-10-CM

## 2018-10-24 DIAGNOSIS — M79.645: Primary | ICD-10-CM

## 2018-10-24 NOTE — PDOC
History of Present Illness





- General


Chief Complaint: Revisit,Wound Recheck


Stated Complaint: PCP SENT/EVALUATION


Time Seen by Provider: 10/24/18 15:47


History Source: Patient


Exam Limitations: No Limitations





- History of Present Illness


Initial Comments: 





10/24/18 16:47


23 yr male with painful area of left index finger, recently on bactrim. pt 

states not improving is red, white and painful. no fever no streaking up hand. 





Past History





- Past Medical History


Allergies/Adverse Reactions: 


 Allergies











Allergy/AdvReac Type Severity Reaction Status Date / Time


 


No Known Allergies Allergy   Verified 10/24/18 17:14











Home Medications: 


Ambulatory Orders





Sulfamethoxazole/Trimethoprim [Bactrim Ds Tablet] 1 each PO BID 7 Days #14 

tablet MDD 2 tab 10/17/18 


Amoxicillin/Potassium Clav [Augmentin 875-125 Tablet] 1 each PO BID #14 tablet 

10/24/18 








Cancer: No


Cardiac Disorders: No


CVA: No


COPD: No


CHF: No





- Surgical History


Cardiac Surgery: No


Cholecystectomy: No


Gastric Stapling: No


GI Surgery: No





- Suicide/Smoking/Psychosocial Hx


Smoking History: Never smoked


Have you smoked in the past 12 months: No


Number of Cigarettes Smoked Daily: 20


'Breaking Loose' booklet given: 10/16/18


Hx Alcohol Use: No


Drug/Substance Use Hx: No


Substance Use Type: None


Hx Substance Use Treatment: No





**Review of Systems





- Review of Systems


Able to Perform ROS?: Yes


Is the patient limited English proficient: No


Constitutional: No: Symptoms Reported


HEENTM: No: Symptoms Reported


Respiratory: No: Symptoms reported


Cardiac (ROS): No: Symptoms Reported


Integumentary: Yes: Symptoms Reported





*Physical Exam





- Vital Signs


 Last Vital Signs











Temp Pulse Resp BP Pulse Ox


 


 98.9 F   85   18   123/67   98 


 


 10/24/18 15:49  10/24/18 15:49  10/24/18 15:49  10/24/18 15:49  10/24/18 15:49














- Physical Exam


General Appearance: Yes: Nourished, Appropriately Dressed


HEENT: positive: EOMI, COOKIE


Extremity: positive: Normal Capillary Refill, Normal Range of Motion, Tender (

distal right index finger medial aspect of cuticle with redness, white patches, 

mild pus drainage , FROM no evidence of felon ).  negative: Swelling, Erythema, 

Inflammation


Integumentary: positive: Normal Color


Neurologic: positive: Fully Oriented, Alert, Normal Mood/Affect, Normal Response

, Motor Strength 5/5





Procedures





- Additional Procedures


Progress: 





10/24/18 16:55


pranoychia drained small amount of pus drainage returned 


dressing applied





ED Treatment Course





- Medications


Given in the ED: 


ED Medications














Discontinued Medications














Generic Name Dose Route Start Last Admin





  Trade Name Halima  PRN Reason Stop Dose Admin


 


Ibuprofen  600 mg  10/24/18 15:54  10/24/18 16:34





  Motrin -  PO  10/24/18 15:55  600 mg





  ONCE ONE   Administration





     





     





     





     














Medical Decision Making





- Medical Decision Making





10/24/18 16:55


cc: finger infection, may be hereptic nicanor with superimposed bacterial 

infection


pt bites cuticles often


neg fever nv intact works as a 





pt denies any known history of herpes gingival 





will have pt follow up with  


start Augmentin to cover oral claude 





pt agrees with the plan of care all questions asked and answered at discharge. 





*DC/Admit/Observation/Transfer


Diagnosis at time of Disposition: 


 Finger pain, left, Paronychia, Herpetic nicanor








- Discharge Dispostion


Disposition: HOME


Condition at time of disposition: Improved





- Prescriptions


Prescriptions: 


Amoxicillin/Potassium Clav [Augmentin 875-125 Tablet] 1 each PO BID #14 tablet





- Referrals


Referrals: 


Anthony Maier MD [Staff Physician] - 


Anthony Mena MD [Primary Care Provider] - 





- Patient Instructions


Additional Instructions: 


keep dry for 2 days then remove the dressing and re-cover if open 


start Augmentin as directed 


follow with  the surgeon if not improving in 3-5 days


if any fever, chills, redness spreading up finger to arm or swelling RETURN TO 

ER 





- Post Discharge Activity


Forms/Work/School Notes:  Back to Work

## 2018-10-24 NOTE — PDOC
Rapid Medical Evaluation


Time Seen by Provider: 10/24/18 15:47


Medical Evaluation: 


 Allergies











Allergy/AdvReac Type Severity Reaction Status Date / Time


 


No Known Allergies Allergy   Verified 09/26/17 00:04











10/24/18 15:51


I have performed a brief in-person evaluation of this patient.


 


The patient presents with a chief complaint of: left index finger wound- 

treated with Bactrim 1 week ago- now worse


 


Pertinent physical exam findings: purulent discharge present from lateral 

cuticle


 


I have ordered the following: motrin 


 


The patient will proceed to the ED for further evaluation.











**Discharge Disposition





- Diagnosis


 Finger pain, left








- Referrals





- Patient Instructions





- Post Discharge Activity

## 2020-11-03 NOTE — HP
Admitting History and Physical





- Admission


History of Present Illness: 


Pt is a 23 y/o male w/ no significant PMH who presented to the ER after feeling 

lightheaded wc began suddenly.  Pt states that he was feeling well all day and 

suddenly developed lightheadedness and dizziness but denies true veritgo.  It 

was associated w/ nausea but no abdominal pain and no vomiting.  Pt went to get 

help from his friends who found pt pale and diaphoretic and brought him to the 

ER. In the ER pt found to be tachy to 120's and febrile to 99.3 w/ slightly 

elevated wbc of 11,000 w/ lactic acid of 2.7.  In the ER pt was given IVF. Pt 

is now symptomatic.  Pt does smoke about 1 PPD and drinks 4-5 cups of coffee 

daily bc he works and then goes to school.


History Source: Patient, Medical Record





- Past Surgical History


Past Surgical History: Yes: None





- Smoking History


Smoking history: Current every day smoker


Have you smoked in the past 12 months: Yes


Aproximately how many cigarettes per day: 5





- Alcohol/Substance Use


Hx Alcohol Use: No





Home Medications





- Allergies


Allergies/Adverse Reactions: 


 Allergies











Allergy/AdvReac Type Severity Reaction Status Date / Time


 


No Known Allergies Allergy   Verified 09/26/17 00:04














- Home Medications


Home Medications: 


Ambulatory Orders





NK [No Known Home Medication]  09/26/17 











Family Disease History





- Family Disease History


Family History: Unremarkable





Review of Systems





- Review of Systems


Constitutional: reports: Chills, Diaphoresis


Eyes: reports: No Symptoms


HENT: reports: No Symptoms


Neck: reports: No Symptoms


Cardiovascular: reports: No Symptoms


Respiratory: reports: No Symptoms


Gastrointestinal: reports: Nausea


Genitourinary: reports: No Symptoms





Physical Examination


Vital Signs: 


 Vital Signs











Temperature  98.2 F   09/26/17 14:14


 


Pulse Rate  76   09/26/17 14:14


 


Respiratory Rate  18   09/26/17 14:14


 


Blood Pressure  118/67   09/26/17 14:14


 


O2 Sat by Pulse Oximetry (%)  100   09/26/17 09:00











Constitutional: Yes: No Distress


Eyes: Yes: WNL


HENT: Yes: WNL


Neck: Yes: WNL, Supple


Cardiovascular: Yes: WNL, Regular Rate and Rhythm


Respiratory: Yes: WNL, Regular, CTA Bilaterally


Gastrointestinal: Yes: WNL, Normal Bowel Sounds, Soft


Musculoskeletal: Yes: WNL


Extremities: Yes: WNL


Edema: No


Neurological: Yes: WNL, Alert, Oriented


...Motor Strength: WNL


Labs: 


 CBC, BMP





 09/26/17 06:43 











Problem List





- Problems


(1) Tachycardia


Assessment/Plan: 


?Dehydration vs sepsis


Cont IVF


As per cardio


Hert rate improved


Cont IV ceftriaxone


Monitor cultures


ID consult


Code(s): R00.0 - TACHYCARDIA, UNSPECIFIED





(2) Near syncope


Assessment/Plan: 


Admitted to tele


No arrythymias


Neuro consult


Code(s): R55 - SYNCOPE AND COLLAPSE
170.18